# Patient Record
Sex: FEMALE | Race: WHITE | NOT HISPANIC OR LATINO | Employment: STUDENT | URBAN - METROPOLITAN AREA
[De-identification: names, ages, dates, MRNs, and addresses within clinical notes are randomized per-mention and may not be internally consistent; named-entity substitution may affect disease eponyms.]

---
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
## 2019-01-03 ENCOUNTER — OFFICE VISIT (OUTPATIENT)
Dept: PHYSICAL THERAPY | Facility: CLINIC | Age: 19
End: 2019-01-03
Payer: COMMERCIAL

## 2019-01-03 DIAGNOSIS — M54.50 ACUTE BILATERAL LOW BACK PAIN WITHOUT SCIATICA: Primary | ICD-10-CM

## 2019-01-03 DIAGNOSIS — M43.16 SPONDYLOLISTHESIS OF LUMBAR REGION: ICD-10-CM

## 2019-01-03 PROCEDURE — 97110 THERAPEUTIC EXERCISES: CPT

## 2019-01-03 PROCEDURE — 97112 NEUROMUSCULAR REEDUCATION: CPT

## 2019-01-03 NOTE — PROGRESS NOTES
Daily Note     Today's date: 1/3/2019  Patient name: Jose Rafael Duarte  : 2000  MRN: [id number]  Referring provider: Jacob Rubio  Dx:   Encounter Diagnosis     ICD-10-CM    1  Acute bilateral low back pain without sciatica M54 5    2  Spondylolisthesis of lumbar region M43 16                   Subjective: Ashley Grissom reports that her pain level is 0/10          Objective: See treatment diary below    Precautions: standard (spondylolisthesis)    Specialty Daily Treatment Diary       Visits: 6  3 4(foto) 5    Manual 1/3/18  12/27/18 12/28/18 12/31/18   STM: mutifidus, paraspinals lumbar   performed  performed     L/S p-a mobs        Man Flexibility: hip flexors        MET: PRn        Total time: Not performed   12 min  8 min  Not performed            Exercise Diary         Rec bike/ nustep Level 4,  1/2 mile, 7:27    Nu step lv 3    TA activation w/ exhale   15x  15x  D/c   Rib trick w/ exhale   15 x 15x     Ball squeeze iso with exhale+ bridge 1atev68   15 x  15x  + bridge 1gbuf57    Hip abd iso with belt + bridge 7lafe95   15x  15x  + bridge 9wwyl65    Bent knee fall out Red 1flgn77    Red 9xkgd32  Red 7abnm55    LTR        Sitting posture                Supine knee to chest single 0duqu79    6ymuo25  8wvyh44    Supine double knee to chest w/ peanut 3mdhe47   2nhxo95  3rklr76  1dver38    HS stretch with SOS 53siuz0     25jrin8    Hip flexor stretch w/ SOS  17wvvh3     39ictm1    Seated lumbar flexion 5 sec x 10   1hxrc37   1sgvw46    Free squat 3 lb (2) 2x10   2x10  3 x 10  W/ press 2x10 3 lb    Hip hinge  10 x     10 x    resisted lumbar extension with hip hinge 2x10     Seated florescent pink theratube 2x10    SLS rows  Red band 2x10        Tandem stance ext Red 2x10 alternating after 10       Total time: 45 min   30 min  20 min (1:1) 40 min            Modalities        MH   10 min prone      ice        Total time:   10 min   Not performed        Assessment:  Good tolerance to today's session,  Entered w/ no pain and had no increase in pain during session  Patient needs only minimal cuing for proper form with there ex was able to progress core activities today with SLS and tandem stance activities which she was only minimally challenged  Plan: progress as toelrated and for lumbar strengthening

## 2019-01-08 ENCOUNTER — OFFICE VISIT (OUTPATIENT)
Dept: PHYSICAL THERAPY | Facility: CLINIC | Age: 19
End: 2019-01-08
Payer: COMMERCIAL

## 2019-01-08 DIAGNOSIS — M43.16 SPONDYLOLISTHESIS OF LUMBAR REGION: ICD-10-CM

## 2019-01-08 DIAGNOSIS — M54.50 ACUTE BILATERAL LOW BACK PAIN WITHOUT SCIATICA: Primary | ICD-10-CM

## 2019-01-08 PROCEDURE — 97110 THERAPEUTIC EXERCISES: CPT

## 2019-01-08 PROCEDURE — 97112 NEUROMUSCULAR REEDUCATION: CPT

## 2019-01-08 NOTE — PROGRESS NOTES
Daily Note     Today's date: 2019  Patient name: Sosa Guerrero  : 2000  MRN: [id number]  Referring provider: FRANCOISE Melendez  Dx:   Encounter Diagnosis     ICD-10-CM    1  Acute bilateral low back pain without sciatica M54 5    2  Spondylolisthesis of lumbar region M43 16                   Subjective: Hafsa Olsen reports that her pain level entering today 1/10,  States Saturday she went to [facility] [place] [facility] with a lot of standing and walking  States she had increased pain but reduced with breathing activities  And resolved the next day         Objective: See treatment diary below  Precautions: standard (spondylolisthesis)    Specialty Daily Treatment Diary       Visits: 6 7 FOTO 4(foto) 5    Manual 1/3/19 1/8/19  12/28/18 12/31/18   STM: mutifidus, paraspinals lumbar    performed     L/S p-a mobs        Man Flexibility: hip flexors        MET: PRn        Total time: Not performed  Not performed   8 min  Not performed            Exercise Diary         Rec bike/ nustep Level 4,  1/2 mile, 7:27 lv 4 10 min    Nu step lv 3    Rib trick w/ exhale  20x   15x     Ball squeeze iso with exhale+ bridge 0urgp87  8lqvt54   15x  + bridge 7lfyj90    Hip abd iso with belt + bridge 6juje77  9kdvw33   15x  + bridge 2vucj03    Bent knee fall out Red 9hrro47  Green 0qeyo20   Red 7wppb88  Red 3glua17    LTR        Sitting posture                Supine knee to chest single 5sjmc19  5 sec x 15   5ydnm44  8vmoz24    Supine double knee to chest w/ peanut 5ycrr02  D/c   6ugtn35  7piam56    HS stretch with SOS 49ahdx5  Next visit    68hxmj3    Hip flexor stretch w/ SOS  30etik0  Next visit    86umiv7    Seated lumbar flexion 5 sec x 10  5secx 10    2qmvf12    Free squat 3 lb (2) 2x10  3lb (2) 2x10   3 x 10  W/ press 2x10 3 lb    Hip hinge  10 x  10x    10 x    resisted lumbar extension with hip hinge 2x10  Pink thera tube 3 x 10    Seated florescent pink theratube 2x10    SLS rows  Red band 2x10  Red 2x10 w/ SLS       Tandem stance tband shoulder ext Red 2x10 alternating after 10 Red 2x10 w/ tandem stance      Standing against the wall pelvic tilt (NR)  2uthw59       Standing unsupported pelvic tilt (NR)  7eiiu20               Total time: 45 min  43  Min   20 min (1:1) 40 min            Modalities        MH  10 min with nustep       ice        Total time:     Not performed          Assessment: patient has a good working knowledge of proper form with therapeutic exercise  Patient denied pain throughout session  Needed cuing for proper form with standing pelvic tilt, reported she performs those exercises for pain management when she is out  In community and needs pain relief  Plan: d/c next session due to patient is returning to college

## 2019-01-10 ENCOUNTER — OFFICE VISIT (OUTPATIENT)
Dept: PHYSICAL THERAPY | Facility: CLINIC | Age: 19
End: 2019-01-10
Payer: COMMERCIAL

## 2019-01-10 ENCOUNTER — TRANSCRIBE ORDERS (OUTPATIENT)
Dept: PHYSICAL THERAPY | Facility: CLINIC | Age: 19
End: 2019-01-10

## 2019-01-10 DIAGNOSIS — M43.16 SPONDYLOLISTHESIS OF LUMBAR REGION: ICD-10-CM

## 2019-01-10 DIAGNOSIS — M54.50 ACUTE BILATERAL LOW BACK PAIN WITHOUT SCIATICA: Primary | ICD-10-CM

## 2019-01-10 PROCEDURE — 97110 THERAPEUTIC EXERCISES: CPT

## 2019-01-10 PROCEDURE — G8979 MOBILITY GOAL STATUS: HCPCS

## 2019-01-10 PROCEDURE — G8980 MOBILITY D/C STATUS: HCPCS

## 2019-01-10 NOTE — PROGRESS NOTES
Daily Note     Today's date: 1/10/2019  Patient name: Norma Cano  : 2000  MRN: [id number]  Referring provider: FRANCOISE Gilman  Dx:   Encounter Diagnosis     ICD-10-CM    1  Acute bilateral low back pain without sciatica M54 5    2  Spondylolisthesis of lumbar region M43 16                   Subjective: Yamileth Acuña reports that her overall improvement was 85-90% improved  Last 10-15% is pain may return walking [place] blocks without the medications  Still taking meloxicam   Function: numerous flights of stairs ie: 10 flights  Would cause pain  Pain at rest: 0/1, with activity: 2/10, at worst: 7/10, after increased stair negotiations while carrying empt boxes  Objective: See treatment diary below  Precautions: standard (spondylolisthesis)    Specialty Daily Treatment Diary       Visits: 6 7 FOTO  5    Manual 1/3/19 1/8/19 1/10/19  12/31/18   STM: mutifidus, paraspinals lumbar        L/S p-a mobs        Man Flexibility: hip flexors        MET: PRn        Total time: Not performed  Not performed    Not performed            Exercise Diary    reeval performed     Rec bike/ nustep Level 4,  1/2 mile, 7:27 lv 4 10 min  lv 6 10 min   Nu step lv 3    Rib trick w/ exhale  20x  20x      Ball squeeze iso with exhale+ bridge 5deub22  1qnqq56  9seml85   + bridge 2imoj33    Hip abd iso with belt + bridge 4duto00  7lhzt69  9luis06   + bridge 9mkdt60    Bent knee fall out Red 3mmmx51  Green 8buwq09  Green 3upxn22   Red 8fijy31    LTR        Sitting posture                Supine knee to chest single 4bdji94  5 sec x 15  5 sec x15   6ubqe27    Supine double knee to chest w/ peanut 5xlwh09  D/c    8slxo18    HS stretch with SOS 04xwaf2  Next visit  44mhks6   89krtz9    Hip flexor stretch w/ SOS  21jahf5  Next visit  Not performed     08cplk1    Seated lumbar flexion 5 sec x 10  5secx 10  8wxan18   5utap41    Free squat 3 lb (2) 2x10  3lb (2) 2x10  3 lb (2) 2x10   W/ press 2x10 3 lb    Hip hinge  10 x  10x  D/c   10 x resisted lumbar extension with hip hinge 2x10  Pink thera tube 3 x 10  3 x 10   Seated florescent pink theratube 2x10    SLS rows  Red band 2x10  Red 2x10 w/ SLS  Red SLS 2x10      Tandem stance tband shoulder ext Red 2x10 alternating after 10 Red 2x10 w/ tandem stance Tandem 2x10      Standing against the wall pelvic tilt (NR)  8gqjf54       Standing unsupported pelvic tilt (NR)  9zjzp82  2hrcu43      Lifting technique   Instruct x 3 techniques, 8 reps of each performed      Total time: 45 min  43  Min  40 min   40 min            Modalities        MH  10 min with nustep       ice        Total time:     Not performed        Goals  STG  + Patient will have pain level 2/10 lumbar spine  at rest (2-3 weeks) (met)   + Patient will report a 35% improvement in symptoms (2-3 weeks) (met)   + Patient will be independent in basic HEP (2-3 weeks) (met)   + Patient will demonstrate good posture with verbal cuing   (2-3 weeks)(met)   + Patient will demonstrate reduced gait deviations ambulating on level surfaces   (2-3 week) (met)   + Patient will demonstrate an increase in range of motion  lumbar spine, extension AROM in all planes  to  minimally limited (2-3 weeks) (met)   + Patient will demonstrate proper lifting mechanics x 2 techniques to avoid future injury with verbal cuing (2-3 weeks) (met)   + Patient will perform proper breathing technique and therefore, appropriate abdominal recruitment independently (2-3 weeks) (met)   + Patient will increase SLS time to  40 seconds (2-3 weeks) (met)   + Patient will report increased ease sleeping due to a reduction in pain (2-3 weeks)(met)   + Patient will demonstrate a good understanding of proper form with sitting posture while playing her instrument (2-4 weeks) (met)   LTG:  + Patient will have pain level 3/10 lumbar spine  with ADL's (4-6 weeks) (met)   + Patient will report a 65% improvement in symptoms (4-6 weeks) (met)   + Patient will increase FOTO score to 75 (8 sessions) (met)   + Patient will be independent in comprehensive HEP (4-6 weeks) (met)   + Patient will demonstrate good posture independently  (4-6 weeks) (met)   + Patient will demonstrate no gait deviations ambulating on level surfaces and painfree  (4-6 week) (met)   + Patient will demonstrate an increase in range of motion  lumbar spine,  AROM in all planes  to  within normal limits  And pain free (4-6 weeks) (met)   + Patient will demonstrate proper lifting mechanics x 3 techniques to avoid future injury independently (4-6 weeks) (met)   + Patient will demonsatrte a good understanding of proper use of exercises to manage pain (4-6 weeks) (met)     Static Posture     Comments (1/10/19)  Posture  Sitting:fair  Standing: good, iliac crest =, mildly reduced lumbar spine, (status quo)     Gait: reduce trunk rotation, reduced hip flexion mild trendelenberg to  Right (wnl no deviations)    Palpation: + TTP in area of L3/L4, and left paraspinals more then right ( NT)    Functional movements  Squat:ble to lower about 50% of motion, + knee pain , - low back pain  (wnl no pain)  SLS: left: 24 sec    Right : 40 sec  + pain ( Left: 40 sec painfree)  Unilateral heel raises: right: 10 x    Left: 10 x needs UE support x 1 finger (2) + low back pain (10 x diana, no pain)  Bridging: pain with lifting hips, only able to lift ~ 50% of motion (wnl no pain)  Transfers sit/stand: nasima getting out of chair with UE assist (wnl no deviations no pain)    Dermatomes (grossly wnl to light touch)    Myotomes (wnl no pain)  L2 (hip flexion): Right 4+/5  Left: 4+/5  L3 (knee extension): Right: 4+/5 Left: 4+/5  L4 (ankle dorsiflexion): Right: 4+/5 Left: 4+/5  L5 (hallux extension): Right: 4+/5  Left: 4+/5  S1 (ankle plantarflexion): Right: 4+/5 Left: 4+/5  Hip abd 4/5 diana in sidelying     Lumbar ROM   Flexion: wnl LOM (+)pain  With return to stand ( wnl no pain)  Extension: moderate LOM (+)pain  (prone press up: 3/10, repeated press up created sharp pain therefore Held exercise) (wnl no pain)  Side bend: Right: wnl LOM (+)pain  Left: wnl LOM (+)pain (wnl diana, no pain)    Repeated motions (repeated flexion reduced pain )  RFIS: increased low back pain WAR pain with return to stand  PREM: increased low back pain  (+)pain   RFIL: Nt   REIL: prone press up (+)pain     Flexibility   Hamstring: Right: fair +  Left: fair + (status quo)   Hip flexors:Right: fair -  Left: fair  - (status quo)   Quads: Right: good  Left: good     Slump test: NT    Hip ROM/MMT: prn    Type I/II dysfunction: prn        Assessment:   Pt was evaluated in Physical therapy on 12/2019  for   1  Acute bilateral low back pain without sciatica    2  Spondylolisthesis of lumbar region      Pt was seen for 7 follow up  visits and demonstrates decreased pain level, improved core stabilization, improved postural awareness, reduced gait deviations, improved spinal ROM and and function tolerance    Pt will benefit from continuing with her comprehensive HEP to maximize function  Patient demonstrated and vocalized independence and is leaving for college in 4 days therefore PT services are being d/c  Overall patient had a positive response to stabilization, flexion based exercise program and posture correction/education  Patient advised to follow up with physician as needed and/or contact PT with any questions  PLOC and goals were discussed and agreed upon  Plan: D/c to HEP  Patient to follow up with physician as needed

## 2019-01-10 NOTE — LETTER
2019    Chidi Coon, [address] [place] [address] Drive    Patient: Norma Cano   YOB: 2000   Date of Visit: 1/10/2019     Encounter Diagnosis     ICD-10-CM    1  Acute bilateral low back pain without sciatica M54 5    2  Spondylolisthesis of lumbar region M43 16        Dear Dr Chetan Stack:    Please review the attached Discharge Summary from [facility] recent visit  PT services have been discharged  If you have any questions or concerns, please don't hesitate to call  Sincerely,    Nikko Conn, PT                        FRANCOISE Erickson  [address] [facility] [place]: [phone]          Daily Note     Today's date: 1/10/2019  Patient name: Norma Cano  : 2000  MRN: [id number]  Referring provider: FRANCOISE Gilman  Dx:   Encounter Diagnosis     ICD-10-CM    1  Acute bilateral low back pain without sciatica M54 5    2  Spondylolisthesis of lumbar region M43 16                   Subjective: Yamileth Acuña reports that her overall improvement was 85-90% improved  Last 10-15% is pain may return walking [facility] blocks without the medications  Still taking meloxicam   Function: numerous flights of stairs ie: 10 flights  Would cause pain  Pain at rest: 0/1, with activity: 2/10, at worst: 7/10, after increased stair negotiations while carrying empt boxes           Objective: See treatment diary below  Precautions: standard (spondylolisthesis)    Specialty Daily Treatment Diary       Visits: 6 7 FOTO  5    Manual 1/3/19 1/8/19 1/10/19  12/31/18   STM: mutifidus, paraspinals lumbar        L/S p-a mobs        Man Flexibility: hip flexors        MET: PRn        Total time: Not performed  Not performed    Not performed            Exercise Diary    reeval performed     Rec bike/ nustep Level 4,  1/2 mile, 7:27 lv 4 10 min  lv 6 10 min   Nu step lv 3    Rib trick w/ exhale  20x  20x      Ball squeeze iso with exhale+ bridge 1jaxa83  8favf55  7lcyg61   + bridge 1zdbo76    Hip abd iso with belt + bridge 0hklp75  0fybt86  6qihi55   + bridge 9lbzm31    Bent knee fall out Red 5raze63  Green 3tcoi03  Green 1colh73   Red 4eefz29    LTR        Sitting posture                Supine knee to chest single 1mpxa13  5 sec x 15  5 sec x15   2nrps14    Supine double knee to chest w/ peanut 1avex68  D/c    8nzwy27    HS stretch with SOS 16kuiy7  Next visit  36pzmx4   01ttei8    Hip flexor stretch w/ SOS  08wbip8  Next visit  Not performed     41xkta2    Seated lumbar flexion 5 sec x 10  5secx 10  3qscr41   9tely29    Free squat 3 lb (2) 2x10  3lb (2) 2x10  3 lb (2) 2x10   W/ press 2x10 3 lb    Hip hinge  10 x  10x  D/c   10 x    resisted lumbar extension with hip hinge 2x10  Pink thera tube 3 x 10  3 x 10   Seated florescent pink theratube 2x10    SLS rows  Red band 2x10  Red 2x10 w/ SLS  Red SLS 2x10      Tandem stance tband shoulder ext Red 2x10 alternating after 10 Red 2x10 w/ tandem stance Tandem 2x10      Standing against the wall pelvic tilt (NR)  1impx28       Standing unsupported pelvic tilt (NR)  7aper53  4ewpn27      Lifting technique   Instruct x 3 techniques, 8 reps of each performed      Total time: 45 min  43  Min  40 min   40 min            Modalities        MH  10 min with nustep       ice        Total time:     Not performed        Goals  STG  + Patient will have pain level 2/10 lumbar spine  at rest (2-3 weeks) (met)   + Patient will report a 35% improvement in symptoms (2-3 weeks) (met)   + Patient will be independent in basic HEP (2-3 weeks) (met)   + Patient will demonstrate good posture with verbal cuing   (2-3 weeks)(met)   + Patient will demonstrate reduced gait deviations ambulating on level surfaces   (2-3 week) (met)   + Patient will demonstrate an increase in range of motion  lumbar spine, extension AROM in all planes  to  minimally limited (2-3 weeks) (met)   + Patient will demonstrate proper lifting mechanics x 2 techniques to avoid future injury with verbal cuing (2-3 weeks) (met)   + Patient will perform proper breathing technique and therefore, appropriate abdominal recruitment independently (2-3 weeks) (met)   + Patient will increase SLS time to  40 seconds (2-3 weeks) (met)   + Patient will report increased ease sleeping due to a reduction in pain (2-3 weeks)(met)   + Patient will demonstrate a good understanding of proper form with sitting posture while playing her instrument (2-4 weeks) (met)   LTG:  + Patient will have pain level 3/10 lumbar spine  with ADL's (4-6 weeks) (met)   + Patient will report a 65% improvement in symptoms (4-6 weeks) (met)   + Patient will increase FOTO score to 75 (8 sessions) (met)   + Patient will be independent in comprehensive HEP (4-6 weeks) (met)   + Patient will demonstrate good posture independently  (4-6 weeks) (met)   + Patient will demonstrate no gait deviations ambulating on level surfaces and painfree  (4-6 week) (met)   + Patient will demonstrate an increase in range of motion  lumbar spine,  AROM in all planes  to  within normal limits  And pain free (4-6 weeks) (met)   + Patient will demonstrate proper lifting mechanics x 3 techniques to avoid future injury independently (4-6 weeks) (met)   + Patient will demonsatrte a good understanding of proper use of exercises to manage pain (4-6 weeks) (met)     Static Posture     Comments (1/10/19)  Posture  Sitting:fair  Standing: good, iliac crest =, mildly reduced lumbar spine, (status quo)     Gait: reduce trunk rotation, reduced hip flexion mild trendelenberg to  Right (wnl no deviations)    Palpation: + TTP in area of L3/L4, and left paraspinals more then right ( NT)    Functional movements  Squat:ble to lower about 50% of motion, + knee pain , - low back pain  (wnl no pain)  SLS: left: 24 sec    Right : 40 sec  + pain ( Left: 40 sec painfree)  Unilateral heel raises: right: 10 x    Left: 10 x needs UE support x 1 finger (2) + low back pain (10 x diana, no pain)  Bridging: pain with lifting hips, only able to lift ~ 50% of motion (wnl no pain)  Transfers sit/stand: nasima getting out of chair with UE assist (wnl no deviations no pain)    Dermatomes (grossly wnl to light touch)    Myotomes (wnl no pain)  L2 (hip flexion): Right 4+/5  Left: 4+/5  L3 (knee extension): Right: 4+/5 Left: 4+/5  L4 (ankle dorsiflexion): Right: 4+/5 Left: 4+/5  L5 (hallux extension): Right: 4+/5  Left: 4+/5  S1 (ankle plantarflexion): Right: 4+/5 Left: 4+/5  Hip abd 4/5 diana in sidelying     Lumbar ROM   Flexion: wnl LOM (+)pain  With return to stand ( wnl no pain)  Extension: moderate LOM (+)pain  (prone press up: 3/10, repeated press up created sharp pain therefore Held exercise) (wnl no pain)  Side bend: Right: wnl LOM (+)pain  Left: wnl LOM (+)pain (wnl diana, no pain)    Repeated motions (repeated flexion reduced pain )  RFIS: increased low back pain WAR pain with return to stand  PREM: increased low back pain  (+)pain   RFIL: Nt   REIL: prone press up (+)pain     Flexibility   Hamstring: Right: fair +  Left: fair + (status quo)   Hip flexors:Right: fair -  Left: fair  - (status quo)   Quads: Right: good  Left: good     Slump test: NT    Hip ROM/MMT: prn    Type I/II dysfunction: prn        Assessment:   Pt was evaluated in Physical therapy on 12/2019  for   1  Acute bilateral low back pain without sciatica    2  Spondylolisthesis of lumbar region      Pt was seen for 7 follow up  visits and demonstrates decreased pain level, improved core stabilization, improved postural awareness, reduced gait deviations, improved spinal ROM and and function tolerance    Pt will benefit from continuing with her comprehensive HEP to maximize function  Patient demonstrated and vocalized independence and is leaving for college in 4 days therefore PT services are being d/c  Overall patient had a positive response to stabilization, flexion based exercise program and posture correction/education   Patient advised to follow up with physician as needed and/or contact PT with any questions  PLOC and goals were discussed and agreed upon  Plan: D/c to HEP  Patient to follow up with physician as needed

## 2020-05-12 ENCOUNTER — TELEPHONE (OUTPATIENT)
Dept: FAMILY MEDICINE CLINIC | Facility: CLINIC | Age: 20
End: 2020-05-12

## 2020-05-15 ENCOUNTER — TELEPHONE (OUTPATIENT)
Dept: FAMILY MEDICINE CLINIC | Facility: CLINIC | Age: 20
End: 2020-05-15

## 2020-05-15 ENCOUNTER — OFFICE VISIT (OUTPATIENT)
Dept: FAMILY MEDICINE CLINIC | Facility: CLINIC | Age: 20
End: 2020-05-15
Payer: COMMERCIAL

## 2020-05-15 VITALS
BODY MASS INDEX: 28.13 KG/M2 | SYSTOLIC BLOOD PRESSURE: 116 MMHG | DIASTOLIC BLOOD PRESSURE: 74 MMHG | TEMPERATURE: 99 F | WEIGHT: 149 LBS | RESPIRATION RATE: 16 BRPM | HEIGHT: 61 IN | HEART RATE: 72 BPM

## 2020-05-15 DIAGNOSIS — Z01.818 PRE-OP EXAMINATION: Primary | ICD-10-CM

## 2020-05-15 DIAGNOSIS — M54.50 EPISODIC LOW BACK PAIN: ICD-10-CM

## 2020-05-15 PROBLEM — H52.203 MYOPIA WITH ASTIGMATISM, BILATERAL: Status: ACTIVE | Noted: 2018-02-16

## 2020-05-15 PROBLEM — H52.13 MYOPIA WITH ASTIGMATISM, BILATERAL: Status: ACTIVE | Noted: 2018-02-16

## 2020-05-15 PROCEDURE — 93000 ELECTROCARDIOGRAM COMPLETE: CPT | Performed by: FAMILY MEDICINE

## 2020-05-15 PROCEDURE — 99215 OFFICE O/P EST HI 40 MIN: CPT | Performed by: FAMILY MEDICINE

## 2020-05-15 RX ORDER — NORGESTIMATE AND ETHINYL ESTRADIOL 0.25-0.035
1 KIT ORAL DAILY
COMMUNITY
Start: 2018-11-26 | End: 2020-05-15 | Stop reason: SDUPTHER

## 2020-05-18 LAB — EXT SARS-COV-2: NOT DETECTED

## 2020-08-11 ENCOUNTER — OFFICE VISIT (OUTPATIENT)
Dept: FAMILY MEDICINE CLINIC | Facility: CLINIC | Age: 20
End: 2020-08-11
Payer: COMMERCIAL

## 2020-08-11 VITALS
OXYGEN SATURATION: 98 % | WEIGHT: 150 LBS | RESPIRATION RATE: 16 BRPM | TEMPERATURE: 98.2 F | HEIGHT: 62 IN | DIASTOLIC BLOOD PRESSURE: 60 MMHG | SYSTOLIC BLOOD PRESSURE: 110 MMHG | BODY MASS INDEX: 27.6 KG/M2 | HEART RATE: 89 BPM

## 2020-08-11 DIAGNOSIS — Z11.4 SCREENING FOR HIV (HUMAN IMMUNODEFICIENCY VIRUS): ICD-10-CM

## 2020-08-11 DIAGNOSIS — Z00.00 WELL ADULT EXAM: Primary | ICD-10-CM

## 2020-08-11 DIAGNOSIS — Z13.6 SCREENING FOR CARDIOVASCULAR CONDITION: ICD-10-CM

## 2020-08-11 PROCEDURE — 1036F TOBACCO NON-USER: CPT | Performed by: FAMILY MEDICINE

## 2020-08-11 PROCEDURE — 3008F BODY MASS INDEX DOCD: CPT | Performed by: FAMILY MEDICINE

## 2020-08-11 PROCEDURE — 3725F SCREEN DEPRESSION PERFORMED: CPT | Performed by: FAMILY MEDICINE

## 2020-08-11 PROCEDURE — 99395 PREV VISIT EST AGE 18-39: CPT | Performed by: FAMILY MEDICINE

## 2020-08-11 RX ORDER — DEXTROAMPHETAMINE SACCHARATE, AMPHETAMINE ASPARTATE, DEXTROAMPHETAMINE SULFATE AND AMPHETAMINE SULFATE 2.5; 2.5; 2.5; 2.5 MG/1; MG/1; MG/1; MG/1
TABLET ORAL
COMMUNITY
Start: 2020-07-28

## 2020-08-11 NOTE — PATIENT INSTRUCTIONS

## 2020-08-14 LAB
ALBUMIN SERPL-MCNC: 4.5 G/DL (ref 3.9–5)
ALBUMIN/GLOB SERPL: 2 {RATIO} (ref 1.2–2.2)
ALP SERPL-CCNC: 75 IU/L (ref 39–117)
ALT SERPL-CCNC: 12 IU/L (ref 0–32)
AST SERPL-CCNC: 14 IU/L (ref 0–40)
BILIRUB SERPL-MCNC: 0.4 MG/DL (ref 0–1.2)
BUN SERPL-MCNC: 9 MG/DL (ref 6–20)
BUN/CREAT SERPL: 15 (ref 9–23)
CALCIUM SERPL-MCNC: 9.2 MG/DL (ref 8.7–10.2)
CHLORIDE SERPL-SCNC: 103 MMOL/L (ref 96–106)
CHOLEST SERPL-MCNC: 180 MG/DL (ref 100–199)
CO2 SERPL-SCNC: 21 MMOL/L (ref 20–29)
CREAT SERPL-MCNC: 0.59 MG/DL (ref 0.57–1)
GLOBULIN SER-MCNC: 2.2 G/DL (ref 1.5–4.5)
GLUCOSE SERPL-MCNC: 83 MG/DL (ref 65–99)
HDLC SERPL-MCNC: 67 MG/DL
HIV 1+2 AB+HIV1 P24 AG SERPL QL IA: NON REACTIVE
LDLC SERPL CALC-MCNC: 99 MG/DL (ref 0–99)
MICRODELETION SYND BLD/T FISH: NORMAL
POTASSIUM SERPL-SCNC: 4 MMOL/L (ref 3.5–5.2)
PROT SERPL-MCNC: 6.7 G/DL (ref 6–8.5)
SL AMB EGFR AFRICAN AMERICAN: 152 ML/MIN/1.73
SL AMB EGFR NON AFRICAN AMERICAN: 131 ML/MIN/1.73
SODIUM SERPL-SCNC: 138 MMOL/L (ref 134–144)
TRIGL SERPL-MCNC: 71 MG/DL (ref 0–149)